# Patient Record
(demographics unavailable — no encounter records)

---

## 2024-12-23 NOTE — CONSULT LETTER
[Dear  ___] : Dear  [unfilled], [Consult Letter:] : I had the pleasure of evaluating your patient, [unfilled]. [Please see my note below.] : Please see my note below. [Consult Closing:] : Thank you very much for allowing me to participate in the care of this patient.  If you have any questions, please do not hesitate to contact me. [Sincerely,] : Sincerely, [FreeTextEntry2] : DANIEL BARKER [FreeTextEntry3] : Armando Griffin MD

## 2024-12-23 NOTE — FAMILY HISTORY
[___ inches] : [unfilled] inches [FreeTextEntry5] : Doesn't remember [FreeTextEntry2] : 12 yr sister Moris Gutiérrez(Resident)

## 2024-12-23 NOTE — HISTORY OF PRESENT ILLNESS
[Headaches] : headaches [Visual Symptoms] : no ~T visual symptoms [Polyuria] : no polyuria [Polydipsia] : no polydipsia [Constipation] : no constipation [FreeTextEntry2] : LEXIE presents with his parents for endocrine evaluation.  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 Karyotype from 12/5/24 was 47 XXY.  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 - History of Present Illness : Lexie Camacho is a 14-year-old male who recently received a diagnosis of Klinefelter syndrome (47,XXY) through genetic testing ordered by Dr. Soto. The patient has a history of glycogen storage disease type 6, diagnosed in early childhood, which is managed with nightly cornstarch doses. He has entered puberty with some breast development noted since January 2023. The patient has been experiencing growth and pubertal changes, including pubic hair development, but his testicles have remained small. Recent hormone testing showed normal testosterone levels with elevated FSH and LH, consistent with primary testicular failure. The patient is otherwise healthy and active, participating in cross-country running. 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 Johnathan Meredith at [facility] - Maternal grandmother - bipolar disorder or schizophrenia (institutionalized) - Social History : - Lives in [place] with parents and younger sister - Also has a place in [place] - Attends 9th grade, youngest in his class - Participates in cross-country running - Excelling academically, in some honors classes History of migraines, particularly after exercise. Occasional constipation

## 2024-12-23 NOTE — HISTORY OF PRESENT ILLNESS
[Headaches] : headaches [Visual Symptoms] : no ~T visual symptoms [Polyuria] : no polyuria [Polydipsia] : no polydipsia [Constipation] : no constipation [FreeTextEntry2] : LEXIE presents with his parents for endocrine evaluation.  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 Karyotype from 12/5/24 was 47 XXY.  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 - History of Present Illness : Lexie Camacho is a 14-year-old male who recently received a diagnosis of Klinefelter syndrome (47,XXY) through genetic testing ordered by Dr. Soto. The patient has a history of glycogen storage disease type 6, diagnosed in early childhood, which is managed with nightly cornstarch doses. He has entered puberty with some breast development noted since January 2023. The patient has been experiencing growth and pubertal changes, including pubic hair development, but his testicles have remained small. Recent hormone testing showed normal testosterone levels with elevated FSH and LH, consistent with primary testicular failure. The patient is otherwise healthy and active, participating in cross-country running. 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 Johnathan Meredith at [facility] - Maternal grandmother - bipolar disorder or schizophrenia (institutionalized) - Social History : - Lives in [place] with parents and younger sister - Also has a place in [place] - Attends 9th grade, youngest in his class - Participates in cross-country running - Excelling academically, in some honors classes History of migraines, particularly after exercise. Occasional constipation

## 2024-12-23 NOTE — PHYSICAL EXAM
[Healthy Appearing] : healthy appearing [Well Nourished] : well nourished [Interactive] : interactive [Normal Appearance] : normal appearance [Well formed] : well formed [Normally Set] : normally set [Normal S1 and S2] : normal S1 and S2 [Clear to Ausculation Bilaterally] : clear to auscultation bilaterally [Abdomen Soft] : soft [Abdomen Tenderness] : non-tender [] : no hepatosplenomegaly [4] : was Bhaskar stage 4 [___] : [unfilled] [Normal] : normal  [Murmur] : no murmurs

## 2024-12-23 NOTE — PAST MEDICAL HISTORY
[At ___ Weeks Gestation] : at [unfilled] weeks gestation [Normal Vaginal Route] : by normal vaginal route [None] : there were no delivery complications [Motor Delay w/ Normal Speech] : patient has motor delay with normal speech [Physical Therapy] : physical therapy [Occupational Therapy] : occupational therapy [Age Appropriate] : age appropriate developmental milestones not met [FreeTextEntry1] : 7 lb 13 oz [FreeTextEntry4] : Jaundice [FreeTextEntry5] : Stopped at 1st grade

## 2024-12-23 NOTE — HISTORY OF PRESENT ILLNESS
[Headaches] : headaches [Visual Symptoms] : no ~T visual symptoms [Polyuria] : no polyuria [Polydipsia] : no polydipsia [Constipation] : no constipation [FreeTextEntry2] : LEXIE presents with his parents for endocrine evaluation.  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 Karyotype from 12/5/24 was 47 XXY.  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 - History of Present Illness : Lexie Camacho is a 14-year-old male who recently received a diagnosis of Klinefelter syndrome (47,XXY) through genetic testing ordered by Dr. Soto. The patient has a history of glycogen storage disease type 6, diagnosed in early childhood, which is managed with nightly cornstarch doses. He has entered puberty with some breast development noted since January 2023. The patient has been experiencing growth and pubertal changes, including pubic hair development, but his testicles have remained small. Recent hormone testing showed normal testosterone levels with elevated FSH and LH, consistent with primary testicular failure. The patient is otherwise healthy and active, participating in cross-country running. 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 Johnathan Meredith at [facility] - Maternal grandmother - bipolar disorder or schizophrenia (institutionalized) - Social History : - Lives in [place] with parents and younger sister - Also has a place in [place] - Attends 9th grade, youngest in his class - Participates in cross-country running - Excelling academically, in some honors classes History of migraines, particularly after exercise. Occasional constipation